# Patient Record
Sex: FEMALE | Race: WHITE | Employment: FULL TIME | URBAN - METROPOLITAN AREA
[De-identification: names, ages, dates, MRNs, and addresses within clinical notes are randomized per-mention and may not be internally consistent; named-entity substitution may affect disease eponyms.]

---

## 2021-01-31 ENCOUNTER — APPOINTMENT (OUTPATIENT)
Dept: CT IMAGING | Age: 56
DRG: 331 | End: 2021-01-31
Attending: PHYSICIAN ASSISTANT
Payer: COMMERCIAL

## 2021-01-31 ENCOUNTER — HOSPITAL ENCOUNTER (INPATIENT)
Age: 56
LOS: 3 days | Discharge: HOME | DRG: 331 | End: 2021-02-03
Attending: EMERGENCY MEDICINE | Admitting: SURGERY
Payer: COMMERCIAL

## 2021-01-31 ENCOUNTER — APPOINTMENT (OUTPATIENT)
Dept: GENERAL RADIOLOGY | Age: 56
DRG: 331 | End: 2021-01-31
Attending: PHYSICIAN ASSISTANT
Payer: COMMERCIAL

## 2021-01-31 DIAGNOSIS — Z90.49 S/P LAPAROSCOPIC APPENDECTOMY: ICD-10-CM

## 2021-01-31 DIAGNOSIS — K35.80 ACUTE APPENDICITIS, UNSPECIFIED ACUTE APPENDICITIS TYPE: Primary | ICD-10-CM

## 2021-01-31 LAB
ALBUMIN SERPL-MCNC: 3.4 G/DL (ref 3.4–5)
ALBUMIN/GLOB SERPL: 0.8 {RATIO} (ref 0.8–1.7)
ALP SERPL-CCNC: 95 U/L (ref 45–117)
ALT SERPL-CCNC: 26 U/L (ref 13–56)
ANION GAP SERPL CALC-SCNC: 6 MMOL/L (ref 3–18)
APPEARANCE UR: CLEAR
APTT PPP: 31.8 SEC (ref 23–36.4)
AST SERPL-CCNC: 16 U/L (ref 10–38)
BASOPHILS # BLD: 0 K/UL (ref 0–0.1)
BASOPHILS NFR BLD: 0 % (ref 0–2)
BILIRUB SERPL-MCNC: 0.5 MG/DL (ref 0.2–1)
BILIRUB UR QL: NEGATIVE
BUN SERPL-MCNC: 16 MG/DL (ref 7–18)
BUN/CREAT SERPL: 22 (ref 12–20)
CALCIUM SERPL-MCNC: 9.4 MG/DL (ref 8.5–10.1)
CHLORIDE SERPL-SCNC: 102 MMOL/L (ref 100–111)
CK MB CFR SERPL CALC: NORMAL % (ref 0–4)
CK MB SERPL-MCNC: <1 NG/ML (ref 5–25)
CK SERPL-CCNC: 45 U/L (ref 26–192)
CO2 SERPL-SCNC: 28 MMOL/L (ref 21–32)
COLOR UR: YELLOW
CREAT SERPL-MCNC: 0.74 MG/DL (ref 0.6–1.3)
DIFFERENTIAL METHOD BLD: ABNORMAL
EOSINOPHIL # BLD: 0.1 K/UL (ref 0–0.4)
EOSINOPHIL NFR BLD: 0 % (ref 0–5)
ERYTHROCYTE [DISTWIDTH] IN BLOOD BY AUTOMATED COUNT: 12.8 % (ref 11.6–14.5)
GLOBULIN SER CALC-MCNC: 4.1 G/DL (ref 2–4)
GLUCOSE SERPL-MCNC: 102 MG/DL (ref 74–99)
GLUCOSE UR STRIP.AUTO-MCNC: NEGATIVE MG/DL
HCG UR QL: NEGATIVE
HCT VFR BLD AUTO: 45.7 % (ref 35–45)
HGB BLD-MCNC: 15.8 G/DL (ref 12–16)
HGB UR QL STRIP: NEGATIVE
INR PPP: 1 (ref 0.8–1.2)
KETONES UR QL STRIP.AUTO: NEGATIVE MG/DL
LEUKOCYTE ESTERASE UR QL STRIP.AUTO: NEGATIVE
LIPASE SERPL-CCNC: 97 U/L (ref 73–393)
LYMPHOCYTES # BLD: 2.5 K/UL (ref 0.9–3.6)
LYMPHOCYTES NFR BLD: 14 % (ref 21–52)
MCH RBC QN AUTO: 31.2 PG (ref 24–34)
MCHC RBC AUTO-ENTMCNC: 34.6 G/DL (ref 31–37)
MCV RBC AUTO: 90.3 FL (ref 74–97)
MONOCYTES # BLD: 1.2 K/UL (ref 0.05–1.2)
MONOCYTES NFR BLD: 7 % (ref 3–10)
NEUTS SEG # BLD: 14.8 K/UL (ref 1.8–8)
NEUTS SEG NFR BLD: 79 % (ref 40–73)
NITRITE UR QL STRIP.AUTO: NEGATIVE
PH UR STRIP: 6 [PH] (ref 5–8)
PLATELET # BLD AUTO: 383 K/UL (ref 135–420)
PMV BLD AUTO: 9.6 FL (ref 9.2–11.8)
POTASSIUM SERPL-SCNC: 3 MMOL/L (ref 3.5–5.5)
PROT SERPL-MCNC: 7.5 G/DL (ref 6.4–8.2)
PROT UR STRIP-MCNC: NEGATIVE MG/DL
PROTHROMBIN TIME: 13.4 SEC (ref 11.5–15.2)
RBC # BLD AUTO: 5.06 M/UL (ref 4.2–5.3)
SODIUM SERPL-SCNC: 136 MMOL/L (ref 136–145)
SP GR UR REFRACTOMETRY: 1.01 (ref 1–1.03)
TROPONIN I SERPL-MCNC: <0.02 NG/ML (ref 0–0.04)
UROBILINOGEN UR QL STRIP.AUTO: 0.2 EU/DL (ref 0.2–1)
WBC # BLD AUTO: 18.6 K/UL (ref 4.6–13.2)

## 2021-01-31 PROCEDURE — 96375 TX/PRO/DX INJ NEW DRUG ADDON: CPT

## 2021-01-31 PROCEDURE — 96365 THER/PROPH/DIAG IV INF INIT: CPT

## 2021-01-31 PROCEDURE — 71045 X-RAY EXAM CHEST 1 VIEW: CPT

## 2021-01-31 PROCEDURE — 74011000258 HC RX REV CODE- 258: Performed by: PHYSICIAN ASSISTANT

## 2021-01-31 PROCEDURE — 83690 ASSAY OF LIPASE: CPT

## 2021-01-31 PROCEDURE — 74177 CT ABD & PELVIS W/CONTRAST: CPT

## 2021-01-31 PROCEDURE — 65270000029 HC RM PRIVATE

## 2021-01-31 PROCEDURE — 82553 CREATINE MB FRACTION: CPT

## 2021-01-31 PROCEDURE — 74011250636 HC RX REV CODE- 250/636: Performed by: PHYSICIAN ASSISTANT

## 2021-01-31 PROCEDURE — 74011250636 HC RX REV CODE- 250/636: Performed by: SURGERY

## 2021-01-31 PROCEDURE — 81025 URINE PREGNANCY TEST: CPT

## 2021-01-31 PROCEDURE — 99218 HC RM OBSERVATION: CPT

## 2021-01-31 PROCEDURE — 99285 EMERGENCY DEPT VISIT HI MDM: CPT

## 2021-01-31 PROCEDURE — 96367 TX/PROPH/DG ADDL SEQ IV INF: CPT

## 2021-01-31 PROCEDURE — 74011000636 HC RX REV CODE- 636: Performed by: EMERGENCY MEDICINE

## 2021-01-31 PROCEDURE — 80053 COMPREHEN METABOLIC PANEL: CPT

## 2021-01-31 PROCEDURE — 85025 COMPLETE CBC W/AUTO DIFF WBC: CPT

## 2021-01-31 PROCEDURE — 93005 ELECTROCARDIOGRAM TRACING: CPT

## 2021-01-31 PROCEDURE — 85610 PROTHROMBIN TIME: CPT

## 2021-01-31 PROCEDURE — 85730 THROMBOPLASTIN TIME PARTIAL: CPT

## 2021-01-31 PROCEDURE — 81003 URINALYSIS AUTO W/O SCOPE: CPT

## 2021-01-31 RX ORDER — SODIUM CHLORIDE, SODIUM LACTATE, POTASSIUM CHLORIDE, CALCIUM CHLORIDE 600; 310; 30; 20 MG/100ML; MG/100ML; MG/100ML; MG/100ML
50 INJECTION, SOLUTION INTRAVENOUS CONTINUOUS
Status: DISCONTINUED | OUTPATIENT
Start: 2021-01-31 | End: 2021-02-03

## 2021-01-31 RX ORDER — ONDANSETRON 2 MG/ML
8 INJECTION INTRAMUSCULAR; INTRAVENOUS
Status: DISCONTINUED | OUTPATIENT
Start: 2021-01-31 | End: 2021-02-03

## 2021-01-31 RX ORDER — MORPHINE SULFATE 2 MG/ML
1 INJECTION, SOLUTION INTRAMUSCULAR; INTRAVENOUS
Status: DISCONTINUED | OUTPATIENT
Start: 2021-01-31 | End: 2021-02-03

## 2021-01-31 RX ORDER — ONDANSETRON 2 MG/ML
4 INJECTION INTRAMUSCULAR; INTRAVENOUS
Status: COMPLETED | OUTPATIENT
Start: 2021-01-31 | End: 2021-01-31

## 2021-01-31 RX ORDER — MORPHINE SULFATE 4 MG/ML
4 INJECTION INTRAVENOUS
Status: COMPLETED | OUTPATIENT
Start: 2021-01-31 | End: 2021-01-31

## 2021-01-31 RX ORDER — POTASSIUM CHLORIDE 7.45 MG/ML
10 INJECTION INTRAVENOUS
Status: COMPLETED | OUTPATIENT
Start: 2021-01-31 | End: 2021-01-31

## 2021-01-31 RX ORDER — SODIUM CHLORIDE 9 MG/ML
100 INJECTION, SOLUTION INTRAVENOUS CONTINUOUS
Status: DISCONTINUED | OUTPATIENT
Start: 2021-01-31 | End: 2021-01-31 | Stop reason: CLARIF

## 2021-01-31 RX ADMIN — MORPHINE SULFATE 4 MG: 4 INJECTION INTRAVENOUS at 21:07

## 2021-01-31 RX ADMIN — SODIUM CHLORIDE, SODIUM LACTATE, POTASSIUM CHLORIDE, AND CALCIUM CHLORIDE 1000 ML: 600; 310; 30; 20 INJECTION, SOLUTION INTRAVENOUS at 18:45

## 2021-01-31 RX ADMIN — PIPERACILLIN AND TAZOBACTAM 4.5 G: 4; .5 INJECTION, POWDER, LYOPHILIZED, FOR SOLUTION INTRAVENOUS; PARENTERAL at 21:28

## 2021-01-31 RX ADMIN — IOPAMIDOL 100 ML: 612 INJECTION, SOLUTION INTRAVENOUS at 19:26

## 2021-01-31 RX ADMIN — SODIUM CHLORIDE, SODIUM LACTATE, POTASSIUM CHLORIDE, AND CALCIUM CHLORIDE 100 ML/HR: 600; 310; 30; 20 INJECTION, SOLUTION INTRAVENOUS at 21:02

## 2021-01-31 RX ADMIN — ONDANSETRON 4 MG: 2 INJECTION INTRAMUSCULAR; INTRAVENOUS at 21:07

## 2021-01-31 RX ADMIN — SODIUM CHLORIDE, SODIUM LACTATE, POTASSIUM CHLORIDE, AND CALCIUM CHLORIDE 1000 ML: 600; 310; 30; 20 INJECTION, SOLUTION INTRAVENOUS at 20:02

## 2021-01-31 RX ADMIN — POTASSIUM CHLORIDE 10 MEQ: 10 INJECTION, SOLUTION INTRAVENOUS at 20:05

## 2021-01-31 NOTE — ED TRIAGE NOTES
Pt reports generalized abdominal cramping that started Saturday then improved some but returned and is now only in the RLQ.

## 2021-01-31 NOTE — ED PROVIDER NOTES
EMERGENCY DEPARTMENT HISTORY AND PHYSICAL EXAM    Date: 1/31/2021  Patient Name: Suraj Lind    History of Presenting Illness     Chief Complaint   Patient presents with    Abdominal Pain     RLQ         History Provided By: Patient    Chief Complaint: abdominal pain    HPI(Context):   6:05 PM  Suraj Lind is a 54 y.o. female with PMHX of HTN who presents to the emergency department C/O RLQ abdominal pain. Associated sxs include anorexia. Pain is 5/10. No radiation. Worse with movement and with standing. Pain began at 0100 as generalized abdominal pain but now focal in RLQ. Pt denies fever, chills, vomiting, dysuria, vaginal bleeding, hx of stones, and any other sxs or complaints. Pt's last PO intake was soup at 1500 today. PCP: Brandt Gamboa DO        Past History     Past Medical History:  Past Medical History:   Diagnosis Date    Hypertension        Past Surgical History:  History reviewed. No pertinent surgical history. Family History:  History reviewed. No pertinent family history. Social History:  Social History     Tobacco Use    Smoking status: Never Smoker    Smokeless tobacco: Never Used   Substance Use Topics    Alcohol use: Yes     Comment: soc    Drug use: Never       Allergies:  No Known Allergies      Review of Systems   Review of Systems   Constitutional: Positive for appetite change. Negative for chills and fever. Gastrointestinal: Positive for abdominal pain. Negative for nausea and vomiting. Genitourinary: Negative for dysuria, hematuria and vaginal bleeding. Musculoskeletal: Negative for back pain. All other systems reviewed and are negative. Physical Exam     Vitals:    01/31/21 1827 01/31/21 1900 01/31/21 2006 01/31/21 2030   BP: (!) 163/68 (!) 153/63  (!) 173/59   Pulse:   99    Resp:       Temp:       SpO2: 96% 98% 98% 97%     Physical Exam  Vitals signs and nursing note reviewed.    Constitutional:       General: She is not in acute distress. Appearance: She is well-developed. She is not diaphoretic. Comments:  female in NAD. Alert. HENT:      Head: Normocephalic and atraumatic. Right Ear: External ear normal.      Left Ear: External ear normal.      Nose: Nose normal.   Eyes:      General: No scleral icterus. Right eye: No discharge. Left eye: No discharge. Conjunctiva/sclera: Conjunctivae normal.   Neck:      Musculoskeletal: Normal range of motion. Cardiovascular:      Rate and Rhythm: Normal rate and regular rhythm. Heart sounds: Normal heart sounds. No murmur. No friction rub. No gallop. Pulmonary:      Effort: Pulmonary effort is normal. No tachypnea, accessory muscle usage or respiratory distress. Breath sounds: Normal breath sounds. No decreased breath sounds, wheezing, rhonchi or rales. Abdominal:      Palpations: Abdomen is soft. Tenderness: There is abdominal tenderness in the right lower quadrant. There is no right CVA tenderness, left CVA tenderness or guarding. Positive signs include McBurney's sign. Musculoskeletal: Normal range of motion. Skin:     General: Skin is warm and dry. Neurological:      Mental Status: She is alert and oriented to person, place, and time.    Psychiatric:         Behavior: Behavior normal.         Judgment: Judgment normal.             Diagnostic Study Results     Labs -     Recent Results (from the past 12 hour(s))   URINALYSIS W/ RFLX MICROSCOPIC    Collection Time: 01/31/21  6:20 PM   Result Value Ref Range    Color YELLOW      Appearance CLEAR      Specific gravity 1.009 1.005 - 1.030      pH (UA) 6.0 5.0 - 8.0      Protein Negative NEG mg/dL    Glucose Negative NEG mg/dL    Ketone Negative NEG mg/dL    Bilirubin Negative NEG      Blood Negative NEG      Urobilinogen 0.2 0.2 - 1.0 EU/dL    Nitrites Negative NEG      Leukocyte Esterase Negative NEG     CBC WITH AUTOMATED DIFF    Collection Time: 01/31/21  6:20 PM   Result Value Ref Range    WBC 18.6 (H) 4.6 - 13.2 K/uL    RBC 5.06 4.20 - 5.30 M/uL    HGB 15.8 12.0 - 16.0 g/dL    HCT 45.7 (H) 35.0 - 45.0 %    MCV 90.3 74.0 - 97.0 FL    MCH 31.2 24.0 - 34.0 PG    MCHC 34.6 31.0 - 37.0 g/dL    RDW 12.8 11.6 - 14.5 %    PLATELET 731 880 - 627 K/uL    MPV 9.6 9.2 - 11.8 FL    NEUTROPHILS 79 (H) 40 - 73 %    LYMPHOCYTES 14 (L) 21 - 52 %    MONOCYTES 7 3 - 10 %    EOSINOPHILS 0 0 - 5 %    BASOPHILS 0 0 - 2 %    ABS. NEUTROPHILS 14.8 (H) 1.8 - 8.0 K/UL    ABS. LYMPHOCYTES 2.5 0.9 - 3.6 K/UL    ABS. MONOCYTES 1.2 0.05 - 1.2 K/UL    ABS. EOSINOPHILS 0.1 0.0 - 0.4 K/UL    ABS. BASOPHILS 0.0 0.0 - 0.1 K/UL    DF AUTOMATED     METABOLIC PANEL, COMPREHENSIVE    Collection Time: 01/31/21  6:20 PM   Result Value Ref Range    Sodium 136 136 - 145 mmol/L    Potassium 3.0 (L) 3.5 - 5.5 mmol/L    Chloride 102 100 - 111 mmol/L    CO2 28 21 - 32 mmol/L    Anion gap 6 3.0 - 18 mmol/L    Glucose 102 (H) 74 - 99 mg/dL    BUN 16 7.0 - 18 MG/DL    Creatinine 0.74 0.6 - 1.3 MG/DL    BUN/Creatinine ratio 22 (H) 12 - 20      GFR est AA >60 >60 ml/min/1.73m2    GFR est non-AA >60 >60 ml/min/1.73m2    Calcium 9.4 8.5 - 10.1 MG/DL    Bilirubin, total 0.5 0.2 - 1.0 MG/DL    ALT (SGPT) 26 13 - 56 U/L    AST (SGOT) 16 10 - 38 U/L    Alk.  phosphatase 95 45 - 117 U/L    Protein, total 7.5 6.4 - 8.2 g/dL    Albumin 3.4 3.4 - 5.0 g/dL    Globulin 4.1 (H) 2.0 - 4.0 g/dL    A-G Ratio 0.8 0.8 - 1.7     LIPASE    Collection Time: 01/31/21  6:20 PM   Result Value Ref Range    Lipase 97 73 - 393 U/L   PROTHROMBIN TIME + INR    Collection Time: 01/31/21  6:20 PM   Result Value Ref Range    Prothrombin time 13.4 11.5 - 15.2 sec    INR 1.0 0.8 - 1.2     PTT    Collection Time: 01/31/21  6:20 PM   Result Value Ref Range    aPTT 31.8 23.0 - 36.4 SEC   HCG URINE, QL. - POC    Collection Time: 01/31/21  6:30 PM   Result Value Ref Range    Pregnancy test,urine (POC) Negative NEG     EKG, 12 LEAD, INITIAL    Collection Time: 01/31/21  8:12 PM Result Value Ref Range    Ventricular Rate 103 BPM    Atrial Rate 103 BPM    P-R Interval 168 ms    QRS Duration 88 ms    Q-T Interval 346 ms    QTC Calculation (Bezet) 453 ms    Calculated P Axis 7 degrees    Calculated R Axis 6 degrees    Calculated T Axis 31 degrees    Diagnosis       Sinus tachycardia  Septal infarct , age undetermined  Abnormal ECG  No previous ECGs available             CT ABD PELV W CONT   Final Result      Inflammatory changes are present associated with a mildly thickened appendix. Appearance is consistent with acute appendicitis      Colonic diverticulosis            XR CHEST PORT    (Results Pending)     CT Results  (Last 48 hours)               01/31/21 1937  CT ABD PELV W CONT Final result    Impression:      Inflammatory changes are present associated with a mildly thickened appendix. Appearance is consistent with acute appendicitis       Colonic diverticulosis               Narrative:  EXAM: CT of the abdomen and pelvis       INDICATION: RLQ abdominal pain   -Additional: None       COMPARISON: None. TECHNIQUE: Axial CT imaging of the abdomen and pelvis was performed after the   uneventful administration of 100 cc of Isovue-300 intravenous contrast.   Multiplanar reformats were generated. One or more dose reduction techniques were   used on this CT: automated exposure control, adjustment of the mAs and/or kVp   according to patient size, and iterative reconstruction techniques. The   specific techniques used on this CT exam have been documented in the patient's   electronic medical record. Digital [employer] in Medicine   (DICOM) format image data are available to nonaffiliated external healthcare   facilities or entities on a secure, media free, reciprocally searchable basis   with patient authorization for at least a 12-month period after this study.         _______________       FINDINGS:       Lower Chest: Scattered atelectasis       Liver, Biliary: Liver is normal. No biliary dilation. Gallbladder is   unremarkable. Pancreas: Normal.       Spleen: Normal.       Adrenals: Normal.       Kidneys: Normal.       Lymph Nodes: No enlarged lymph nodes. Gastrointestinal Tract: Inflammatory changes are present adjacent to the cecum   and appendix. The appendix is mildly thickened measuring 13 mm in diameter. The   findings are compatible with acute appendicitis. No abscess or perforation is   evident. Scattered colonic diverticula are evident       Pelvic Organs: Unremarkable. Vasculature: Unremarkable. Bones: No acute or aggressive osseous abnormalities identified. Other: None.       _______________               CXR Results  (Last 48 hours)    None          Medications given in the ED-  Medications   cefOXitin (MEFOXIN) 2 g in 0.9% sodium chloride (MBP/ADV) 50 mL MBP (has no administration in time range)   lactated Ringers infusion (100 mL/hr IntraVENous New Bag 1/31/21 2102)   morphine injection 1 mg (has no administration in time range)   ondansetron (ZOFRAN) injection 8 mg (has no administration in time range)   piperacillin-tazobactam (ZOSYN) 4.5 g in 0.9% sodium chloride (MBP/ADV) 100 mL MBP (has no administration in time range)   lactated ringers bolus infusion 1,000 mL (0 mL IntraVENous IV Completed 1/31/21 1926)   iopamidoL (ISOVUE 300) 61 % contrast injection 100 mL (100 mL IntraVENous Given 1/31/21 1926)   potassium chloride 10 mEq in 100 ml IVPB (10 mEq IntraVENous New Bag 1/31/21 2005)   lactated ringers bolus infusion 1,000 mL (0 mL IntraVENous IV Completed 1/31/21 2100)   morphine injection 4 mg (4 mg IntraVENous Given 1/31/21 2107)   ondansetron (ZOFRAN) injection 4 mg (4 mg IntraVENous Given 1/31/21 2107)         Medical Decision Making   I am the first provider for this patient.     I reviewed the vital signs, available nursing notes, past medical history, past surgical history, family history and social history. Vital Signs-Reviewed the patient's vital signs. Pulse Oximetry Analysis - 100% on RA. NORMAL      Records Reviewed: Nursing Notes    Provider Notes (Medical Decision Making): appy, colitis, diverticulitis, gastroenteritis, pancreatitis, hepatitis, UTI/pyelo, stone, ovarian cyst    Procedures:  Procedures    ED Course:   6:05 PM Initial assessment performed. The patients presenting problems have been discussed, and they are in agreement with the care plan formulated and outlined with them. I have encouraged them to ask questions as they arise throughout their visit. Diagnosis and Disposition       9:13 PM Consult: I discussed care with Dr. Ana Burkett (General Surgery) It was a standard discussion, including history of patients chief complaint, available diagnostic results, and treatment course. Agrees with Zosyn. Will add cardiac enzymes and CXR. EKG unremarkable (reviewed with attending). Plan is for lap appy at 0530 tomorrow. 1. Acute appendicitis, unspecified acute appendicitis type        PLAN:  1. Admit to General Surgery  _______________________________  Attestations: This note is prepared by Raji Villalpando PA-C.  _______________________________          Please note that this dictation was completed with [employer], the computer voice recognition software. Quite often unanticipated grammatical, syntax, homophones, and other interpretive errors are inadvertently transcribed by the computer software. Please disregard these errors. Please excuse any errors that have escaped final proofreading.

## 2021-02-01 ENCOUNTER — ANESTHESIA EVENT (OUTPATIENT)
Dept: SURGERY | Age: 56
DRG: 331 | End: 2021-02-01
Payer: COMMERCIAL

## 2021-02-01 ENCOUNTER — ANESTHESIA (OUTPATIENT)
Dept: SURGERY | Age: 56
DRG: 331 | End: 2021-02-01
Payer: COMMERCIAL

## 2021-02-01 PROBLEM — K35.32 ACUTE APPENDICITIS WITH PERFORATION AND LOCALIZED PERITONITIS: Status: ACTIVE | Noted: 2021-02-01

## 2021-02-01 PROBLEM — Z90.49 S/P LAPAROSCOPIC APPENDECTOMY: Status: ACTIVE | Noted: 2021-02-01

## 2021-02-01 LAB
ATRIAL RATE: 103 BPM
CALCULATED P AXIS, ECG09: 7 DEGREES
CALCULATED R AXIS, ECG10: 6 DEGREES
CALCULATED T AXIS, ECG11: 31 DEGREES
DIAGNOSIS, 93000: NORMAL
P-R INTERVAL, ECG05: 168 MS
Q-T INTERVAL, ECG07: 346 MS
QRS DURATION, ECG06: 88 MS
QTC CALCULATION (BEZET), ECG08: 453 MS
VENTRICULAR RATE, ECG03: 103 BPM

## 2021-02-01 PROCEDURE — 77030008477 HC STYL SATN SLP COVD -A: Performed by: ANESTHESIOLOGY

## 2021-02-01 PROCEDURE — 77030034154 HC SHR COAG HARM ACE J&J -F: Performed by: SURGERY

## 2021-02-01 PROCEDURE — 74011000250 HC RX REV CODE- 250: Performed by: ANESTHESIOLOGY

## 2021-02-01 PROCEDURE — 74011250636 HC RX REV CODE- 250/636: Performed by: REGISTERED NURSE

## 2021-02-01 PROCEDURE — 77030003578 HC NDL INSUF VERES AMR -B: Performed by: SURGERY

## 2021-02-01 PROCEDURE — 2709999900 HC NON-CHARGEABLE SUPPLY: Performed by: SURGERY

## 2021-02-01 PROCEDURE — 76010000149 HC OR TIME 1 TO 1.5 HR: Performed by: SURGERY

## 2021-02-01 PROCEDURE — 77030010030: Performed by: SURGERY

## 2021-02-01 PROCEDURE — 77030008574 HC TBNG SUC IRR STRY -B: Performed by: SURGERY

## 2021-02-01 PROCEDURE — 77030008608 HC TRCR ENDOSC SMTH AMR -B: Performed by: SURGERY

## 2021-02-01 PROCEDURE — 77030002916 HC SUT ETHLN J&J -A: Performed by: SURGERY

## 2021-02-01 PROCEDURE — 77030031139 HC SUT VCRL2 J&J -A: Performed by: SURGERY

## 2021-02-01 PROCEDURE — 77030010507 HC ADH SKN DERMBND J&J -B: Performed by: SURGERY

## 2021-02-01 PROCEDURE — 77030013567 HC DRN WND RESERV BARD -A: Performed by: SURGERY

## 2021-02-01 PROCEDURE — 76060000033 HC ANESTHESIA 1 TO 1.5 HR: Performed by: SURGERY

## 2021-02-01 PROCEDURE — 74011000250 HC RX REV CODE- 250: Performed by: SURGERY

## 2021-02-01 PROCEDURE — 77030008518 HC TBNG INSUF ENDO STRY -B: Performed by: SURGERY

## 2021-02-01 PROCEDURE — 77030018875 HC APPL CLP LIG4 J&J -B: Performed by: SURGERY

## 2021-02-01 PROCEDURE — 74011250636 HC RX REV CODE- 250/636: Performed by: ANESTHESIOLOGY

## 2021-02-01 PROCEDURE — 74011250637 HC RX REV CODE- 250/637: Performed by: SURGERY

## 2021-02-01 PROCEDURE — 77030009967 HC RELD STPLR ENDOSC J&J -C: Performed by: SURGERY

## 2021-02-01 PROCEDURE — 77030011296 HC FCPS GRSP ENDOSC J&J -B: Performed by: SURGERY

## 2021-02-01 PROCEDURE — 0DTJ4ZZ RESECTION OF APPENDIX, PERCUTANEOUS ENDOSCOPIC APPROACH: ICD-10-PCS | Performed by: SURGERY

## 2021-02-01 PROCEDURE — 99218 HC RM OBSERVATION: CPT

## 2021-02-01 PROCEDURE — 77030009851 HC PCH RTVR ENDOSC AMR -B: Performed by: SURGERY

## 2021-02-01 PROCEDURE — 74011000258 HC RX REV CODE- 258: Performed by: SURGERY

## 2021-02-01 PROCEDURE — 77030020829: Performed by: SURGERY

## 2021-02-01 PROCEDURE — 77030006643: Performed by: ANESTHESIOLOGY

## 2021-02-01 PROCEDURE — 77030040504 HC DRN WND MDII -B: Performed by: SURGERY

## 2021-02-01 PROCEDURE — 77030008683 HC TU ET CUF COVD -A: Performed by: ANESTHESIOLOGY

## 2021-02-01 PROCEDURE — 77010033678 HC OXYGEN DAILY

## 2021-02-01 PROCEDURE — 77030013079 HC BLNKT BAIR HGGR 3M -A: Performed by: ANESTHESIOLOGY

## 2021-02-01 PROCEDURE — 77030016151 HC PROTCTR LNS DFOG COVD -B: Performed by: SURGERY

## 2021-02-01 PROCEDURE — 76210000006 HC OR PH I REC 0.5 TO 1 HR: Performed by: SURGERY

## 2021-02-01 PROCEDURE — 77030009403 HC ELECTRD ENDO MEGA -B: Performed by: SURGERY

## 2021-02-01 PROCEDURE — 0DBH4ZZ EXCISION OF CECUM, PERCUTANEOUS ENDOSCOPIC APPROACH: ICD-10-PCS | Performed by: SURGERY

## 2021-02-01 PROCEDURE — 77030011810 HC STPLR ENDOSC J&J -G: Performed by: SURGERY

## 2021-02-01 PROCEDURE — 88304 TISSUE EXAM BY PATHOLOGIST: CPT

## 2021-02-01 PROCEDURE — 65270000029 HC RM PRIVATE

## 2021-02-01 PROCEDURE — 74011250636 HC RX REV CODE- 250/636: Performed by: SURGERY

## 2021-02-01 PROCEDURE — 77030002933 HC SUT MCRYL J&J -A: Performed by: SURGERY

## 2021-02-01 PROCEDURE — 77030012770 HC TRCR OPT FX AMR -B: Performed by: SURGERY

## 2021-02-01 RX ORDER — ESMOLOL HYDROCHLORIDE 10 MG/ML
INJECTION INTRAVENOUS AS NEEDED
Status: DISCONTINUED | OUTPATIENT
Start: 2021-02-01 | End: 2021-02-01 | Stop reason: HOSPADM

## 2021-02-01 RX ORDER — ACETAMINOPHEN 325 MG/1
650 TABLET ORAL
Status: DISCONTINUED | OUTPATIENT
Start: 2021-02-01 | End: 2021-02-03 | Stop reason: HOSPADM

## 2021-02-01 RX ORDER — KETOROLAC TROMETHAMINE 15 MG/ML
INJECTION, SOLUTION INTRAMUSCULAR; INTRAVENOUS AS NEEDED
Status: DISCONTINUED | OUTPATIENT
Start: 2021-02-01 | End: 2021-02-01 | Stop reason: HOSPADM

## 2021-02-01 RX ORDER — FENTANYL CITRATE 50 UG/ML
INJECTION, SOLUTION INTRAMUSCULAR; INTRAVENOUS AS NEEDED
Status: DISCONTINUED | OUTPATIENT
Start: 2021-02-01 | End: 2021-02-01 | Stop reason: HOSPADM

## 2021-02-01 RX ORDER — DEXTROSE MONOHYDRATE 100 MG/ML
125-250 INJECTION, SOLUTION INTRAVENOUS AS NEEDED
Status: DISCONTINUED | OUTPATIENT
Start: 2021-02-01 | End: 2021-02-01 | Stop reason: HOSPADM

## 2021-02-01 RX ORDER — ROCURONIUM BROMIDE 10 MG/ML
INJECTION, SOLUTION INTRAVENOUS AS NEEDED
Status: DISCONTINUED | OUTPATIENT
Start: 2021-02-01 | End: 2021-02-01 | Stop reason: HOSPADM

## 2021-02-01 RX ORDER — HYDROCHLOROTHIAZIDE 25 MG/1
25 TABLET ORAL DAILY
COMMUNITY

## 2021-02-01 RX ORDER — SODIUM CHLORIDE 0.9 % (FLUSH) 0.9 %
5-40 SYRINGE (ML) INJECTION AS NEEDED
Status: DISCONTINUED | OUTPATIENT
Start: 2021-02-01 | End: 2021-02-01 | Stop reason: HOSPADM

## 2021-02-01 RX ORDER — HYDROCHLOROTHIAZIDE 25 MG/1
25 TABLET ORAL DAILY
Status: DISCONTINUED | OUTPATIENT
Start: 2021-02-01 | End: 2021-02-03 | Stop reason: HOSPADM

## 2021-02-01 RX ORDER — POTASSIUM CHLORIDE 7.45 MG/ML
10 INJECTION INTRAVENOUS
Status: DISCONTINUED | OUTPATIENT
Start: 2021-02-01 | End: 2021-02-01

## 2021-02-01 RX ORDER — LIDOCAINE HYDROCHLORIDE 20 MG/ML
INJECTION, SOLUTION EPIDURAL; INFILTRATION; INTRACAUDAL; PERINEURAL AS NEEDED
Status: DISCONTINUED | OUTPATIENT
Start: 2021-02-01 | End: 2021-02-01 | Stop reason: HOSPADM

## 2021-02-01 RX ORDER — NEOSTIGMINE METHYLSULFATE 1 MG/ML
INJECTION, SOLUTION INTRAVENOUS AS NEEDED
Status: DISCONTINUED | OUTPATIENT
Start: 2021-02-01 | End: 2021-02-01 | Stop reason: HOSPADM

## 2021-02-01 RX ORDER — MIDAZOLAM HYDROCHLORIDE 1 MG/ML
INJECTION, SOLUTION INTRAMUSCULAR; INTRAVENOUS AS NEEDED
Status: DISCONTINUED | OUTPATIENT
Start: 2021-02-01 | End: 2021-02-01 | Stop reason: HOSPADM

## 2021-02-01 RX ORDER — FLUMAZENIL 0.1 MG/ML
0.2 INJECTION INTRAVENOUS
Status: DISCONTINUED | OUTPATIENT
Start: 2021-02-01 | End: 2021-02-01 | Stop reason: HOSPADM

## 2021-02-01 RX ORDER — DEXAMETHASONE SODIUM PHOSPHATE 4 MG/ML
INJECTION, SOLUTION INTRA-ARTICULAR; INTRALESIONAL; INTRAMUSCULAR; INTRAVENOUS; SOFT TISSUE AS NEEDED
Status: DISCONTINUED | OUTPATIENT
Start: 2021-02-01 | End: 2021-02-01 | Stop reason: HOSPADM

## 2021-02-01 RX ORDER — KETAMINE HCL IN 0.9 % NACL 50 MG/5 ML
SYRINGE (ML) INTRAVENOUS AS NEEDED
Status: DISCONTINUED | OUTPATIENT
Start: 2021-02-01 | End: 2021-02-01 | Stop reason: HOSPADM

## 2021-02-01 RX ORDER — PROPOFOL 10 MG/ML
INJECTION, EMULSION INTRAVENOUS AS NEEDED
Status: DISCONTINUED | OUTPATIENT
Start: 2021-02-01 | End: 2021-02-01 | Stop reason: HOSPADM

## 2021-02-01 RX ORDER — ZOLPIDEM TARTRATE 5 MG/1
5 TABLET ORAL
Status: DISCONTINUED | OUTPATIENT
Start: 2021-02-01 | End: 2021-02-03 | Stop reason: HOSPADM

## 2021-02-01 RX ORDER — ESZOPICLONE 3 MG/1
3 TABLET, FILM COATED ORAL
COMMUNITY

## 2021-02-01 RX ORDER — POTASSIUM CHLORIDE 7.45 MG/ML
10 INJECTION INTRAVENOUS
Status: DISCONTINUED | OUTPATIENT
Start: 2021-02-01 | End: 2021-02-01 | Stop reason: SDUPTHER

## 2021-02-01 RX ORDER — HYDROMORPHONE HYDROCHLORIDE 1 MG/ML
0.5 INJECTION, SOLUTION INTRAMUSCULAR; INTRAVENOUS; SUBCUTANEOUS
Status: DISCONTINUED | OUTPATIENT
Start: 2021-02-01 | End: 2021-02-01 | Stop reason: HOSPADM

## 2021-02-01 RX ORDER — NALOXONE HYDROCHLORIDE 0.4 MG/ML
0.1 INJECTION, SOLUTION INTRAMUSCULAR; INTRAVENOUS; SUBCUTANEOUS AS NEEDED
Status: DISCONTINUED | OUTPATIENT
Start: 2021-02-01 | End: 2021-02-01 | Stop reason: HOSPADM

## 2021-02-01 RX ORDER — HYDROMORPHONE HYDROCHLORIDE 2 MG/ML
INJECTION, SOLUTION INTRAMUSCULAR; INTRAVENOUS; SUBCUTANEOUS AS NEEDED
Status: DISCONTINUED | OUTPATIENT
Start: 2021-02-01 | End: 2021-02-01 | Stop reason: HOSPADM

## 2021-02-01 RX ORDER — MAGNESIUM SULFATE 100 %
4 CRYSTALS MISCELLANEOUS AS NEEDED
Status: DISCONTINUED | OUTPATIENT
Start: 2021-02-01 | End: 2021-02-01 | Stop reason: HOSPADM

## 2021-02-01 RX ORDER — BUPIVACAINE HYDROCHLORIDE AND EPINEPHRINE 5; 5 MG/ML; UG/ML
INJECTION, SOLUTION EPIDURAL; INTRACAUDAL; PERINEURAL AS NEEDED
Status: DISCONTINUED | OUTPATIENT
Start: 2021-02-01 | End: 2021-02-01 | Stop reason: HOSPADM

## 2021-02-01 RX ORDER — ONDANSETRON 2 MG/ML
INJECTION INTRAMUSCULAR; INTRAVENOUS AS NEEDED
Status: DISCONTINUED | OUTPATIENT
Start: 2021-02-01 | End: 2021-02-01 | Stop reason: HOSPADM

## 2021-02-01 RX ORDER — SUCCINYLCHOLINE CHLORIDE 100 MG/5ML
SYRINGE (ML) INTRAVENOUS AS NEEDED
Status: DISCONTINUED | OUTPATIENT
Start: 2021-02-01 | End: 2021-02-01 | Stop reason: HOSPADM

## 2021-02-01 RX ORDER — GLYCOPYRROLATE 0.2 MG/ML
INJECTION INTRAMUSCULAR; INTRAVENOUS AS NEEDED
Status: DISCONTINUED | OUTPATIENT
Start: 2021-02-01 | End: 2021-02-01 | Stop reason: HOSPADM

## 2021-02-01 RX ORDER — FENTANYL CITRATE 50 UG/ML
50 INJECTION, SOLUTION INTRAMUSCULAR; INTRAVENOUS AS NEEDED
Status: DISCONTINUED | OUTPATIENT
Start: 2021-02-01 | End: 2021-02-01 | Stop reason: HOSPADM

## 2021-02-01 RX ORDER — OXYCODONE AND ACETAMINOPHEN 5; 325 MG/1; MG/1
1 TABLET ORAL
Status: DISCONTINUED | OUTPATIENT
Start: 2021-02-01 | End: 2021-02-03 | Stop reason: HOSPADM

## 2021-02-01 RX ORDER — SODIUM CHLORIDE 0.9 % (FLUSH) 0.9 %
5-40 SYRINGE (ML) INJECTION EVERY 8 HOURS
Status: DISCONTINUED | OUTPATIENT
Start: 2021-02-01 | End: 2021-02-01 | Stop reason: HOSPADM

## 2021-02-01 RX ORDER — POTASSIUM CHLORIDE 7.45 MG/ML
10 INJECTION INTRAVENOUS
Status: COMPLETED | OUTPATIENT
Start: 2021-02-01 | End: 2021-02-01

## 2021-02-01 RX ADMIN — ACETAMINOPHEN 650 MG: 325 TABLET ORAL at 17:21

## 2021-02-01 RX ADMIN — POTASSIUM CHLORIDE 10 MEQ: 10 INJECTION, SOLUTION INTRAVENOUS at 15:39

## 2021-02-01 RX ADMIN — ESMOLOL HYDROCHLORIDE 30 MG: 10 INJECTION, SOLUTION INTRAVENOUS at 05:59

## 2021-02-01 RX ADMIN — PIPERACILLIN AND TAZOBACTAM 3.38 G: 3; .375 INJECTION, POWDER, LYOPHILIZED, FOR SOLUTION INTRAVENOUS at 22:21

## 2021-02-01 RX ADMIN — HYDROMORPHONE HYDROCHLORIDE 0.5 MG: 2 INJECTION, SOLUTION INTRAMUSCULAR; INTRAVENOUS; SUBCUTANEOUS at 06:25

## 2021-02-01 RX ADMIN — PROPOFOL 200 MG: 10 INJECTION, EMULSION INTRAVENOUS at 05:59

## 2021-02-01 RX ADMIN — ROCURONIUM BROMIDE 30 MG: 10 INJECTION, SOLUTION INTRAVENOUS at 06:14

## 2021-02-01 RX ADMIN — POTASSIUM CHLORIDE 10 MEQ: 10 INJECTION, SOLUTION INTRAVENOUS at 20:21

## 2021-02-01 RX ADMIN — PIPERACILLIN AND TAZOBACTAM 3.38 G: 3; .375 INJECTION, POWDER, LYOPHILIZED, FOR SOLUTION INTRAVENOUS at 14:07

## 2021-02-01 RX ADMIN — MIDAZOLAM 2 MG: 1 INJECTION INTRAMUSCULAR; INTRAVENOUS at 05:54

## 2021-02-01 RX ADMIN — ACETAMINOPHEN 650 MG: 325 TABLET ORAL at 22:20

## 2021-02-01 RX ADMIN — KETOROLAC TROMETHAMINE 30 MG: 15 INJECTION, SOLUTION INTRAMUSCULAR; INTRAVENOUS at 07:02

## 2021-02-01 RX ADMIN — DEXAMETHASONE SODIUM PHOSPHATE 4 MG: 4 INJECTION, SOLUTION INTRAMUSCULAR; INTRAVENOUS at 06:51

## 2021-02-01 RX ADMIN — DEXAMETHASONE SODIUM PHOSPHATE 4 MG: 4 INJECTION, SOLUTION INTRAMUSCULAR; INTRAVENOUS at 06:13

## 2021-02-01 RX ADMIN — ONDANSETRON HYDROCHLORIDE 4 MG: 2 INJECTION INTRAMUSCULAR; INTRAVENOUS at 06:51

## 2021-02-01 RX ADMIN — SODIUM CHLORIDE, SODIUM LACTATE, POTASSIUM CHLORIDE, AND CALCIUM CHLORIDE 100 ML/HR: 600; 310; 30; 20 INJECTION, SOLUTION INTRAVENOUS at 07:32

## 2021-02-01 RX ADMIN — HYDROMORPHONE HYDROCHLORIDE 0.5 MG: 2 INJECTION, SOLUTION INTRAMUSCULAR; INTRAVENOUS; SUBCUTANEOUS at 05:40

## 2021-02-01 RX ADMIN — POTASSIUM CHLORIDE 10 MEQ: 10 INJECTION, SOLUTION INTRAVENOUS at 17:26

## 2021-02-01 RX ADMIN — GLYCOPYRROLATE 0.6 MG: 0.2 INJECTION INTRAMUSCULAR; INTRAVENOUS at 07:06

## 2021-02-01 RX ADMIN — SODIUM CHLORIDE, SODIUM LACTATE, POTASSIUM CHLORIDE, AND CALCIUM CHLORIDE 100 ML/HR: 600; 310; 30; 20 INJECTION, SOLUTION INTRAVENOUS at 10:24

## 2021-02-01 RX ADMIN — SODIUM CHLORIDE, SODIUM LACTATE, POTASSIUM CHLORIDE, AND CALCIUM CHLORIDE: 600; 310; 30; 20 INJECTION, SOLUTION INTRAVENOUS at 06:10

## 2021-02-01 RX ADMIN — ONDANSETRON HYDROCHLORIDE 4 MG: 2 INJECTION INTRAMUSCULAR; INTRAVENOUS at 06:15

## 2021-02-01 RX ADMIN — ROCURONIUM BROMIDE 20 MG: 10 INJECTION, SOLUTION INTRAVENOUS at 05:59

## 2021-02-01 RX ADMIN — Medication 3 MG: at 07:03

## 2021-02-01 RX ADMIN — CEFOXITIN SODIUM 2 G: 2 POWDER, FOR SOLUTION INTRAVENOUS at 05:57

## 2021-02-01 RX ADMIN — MORPHINE SULFATE 1 MG: 2 INJECTION, SOLUTION INTRAMUSCULAR; INTRAVENOUS at 01:56

## 2021-02-01 RX ADMIN — Medication 30 MG: at 05:59

## 2021-02-01 RX ADMIN — ZOLPIDEM TARTRATE 5 MG: 5 TABLET ORAL at 22:26

## 2021-02-01 RX ADMIN — FENTANYL CITRATE 50 MCG: 50 INJECTION, SOLUTION INTRAMUSCULAR; INTRAVENOUS at 06:14

## 2021-02-01 RX ADMIN — LIDOCAINE HYDROCHLORIDE 100 MG: 20 INJECTION, SOLUTION EPIDURAL; INFILTRATION; INTRACAUDAL; PERINEURAL at 05:59

## 2021-02-01 RX ADMIN — Medication 100 MG: at 05:59

## 2021-02-01 RX ADMIN — Medication 20 MG: at 06:37

## 2021-02-01 RX ADMIN — FENTANYL CITRATE 50 MCG: 50 INJECTION, SOLUTION INTRAMUSCULAR; INTRAVENOUS at 06:48

## 2021-02-01 RX ADMIN — POTASSIUM CHLORIDE 10 MEQ: 10 INJECTION, SOLUTION INTRAVENOUS at 18:28

## 2021-02-01 NOTE — OP NOTES
[facility]  OPERAT[facility]    Name:  Sheilda Aschoff  MR#:   [id number]  :  1965  ACCOUNT #:  [de-identified]  DATE OF SERVICE:  2021    GENERAL SURGERY OPERATIVE NOTE    PREOPERATIVE DIAGNOSIS:  Acute appendicitis. POSTOPERATIVE DIAGNOSIS:  Acute appendicitis with perforation and small contained abscess. PROCEDURES PERFORMED:  1. Laparoscopic appendectomy. 1.  Laparoscopic partial cecectomy. SURGEON:  Mp Lopez MD    ASSISTANT:  None. ANESTHESIA:  General.    COMPLICATIONS:  None. SPECIMENS REMOVED:  Appendix, attached mesoappendix, and adjacent portion of cecum for permanent pathology. DRAINS:  A 19-Stateless channeled Seamus-Pulliam drain. IMPLANTS:  (SEE DRAIN). ESTIMATED BLOOD LOSS:  Minimal.    PROCEDURE:  The patient is a 66-year-old white female, admitted to the [facility] Emergency Room with a clinical and radiologic diagnosis of acute appendicitis. Symptoms have been going on at the time of my evaluation for approximately 48 hours. White count was elevated at 18. She was taken to the operating room, met and identified in the preoperative holding area by the operating room team.  A dose of Zosyn was given in the emergency room. An additional dose of Mefoxin 2 g given per preop protocol. She was then brought into the operating room, positioned on the table with all pressure points appropriately padded. Sequential compression devices were applied. It should be mentioned that the patient voided immediately before being brought back into the operating room in the preoperative holding area. General anesthesia was then administered with monitors and oxygen in place. The area was shaved with electric clippers, prepped and draped in the usual sterile fashion. After surgical pause, surgical sites were locally anesthetized with 0.25% Marcaine with epinephrine.   We started with a 5-mm transverse supraumbilical incision with 81-YFUMR through skin and dermis. The underlying subcutaneous tissue was bluntly dissected down to the midline fascia. Then, while elevating the adjacent umbilicus using penetrating towel clamp, Veress needle was inserted through the midline fascia, confirmed to be in the correct intra-abdominal position using saline aspiration followed by injection. Once this position was confirmed, Veress needle was used to establish pneumoperitoneum to a pressure of 15 mmHg. Once this pressure achieved, the Veress needle was removed and a 5-mm bladeless trocar was inserted while elevating the adjacent umbilicus using penetrating towel clamp. A 30-degree laparoscope was inserted. There was no evidence of any Veress needle or trocar-induced trauma to the underlying abdominal viscera. This area was again visualized at the end of procedure from the left lower quadrant trocar. There were no adhesions in this area and again no evidence of trauma to the underlying abdominal viscera was present. The patient was placed in Trendelenburg position, airplane right-side up. Next, a 5-mm transverse suprapubic incision was made, and through this, a 5-mm bladeless trocar was inserted under laparoscopic vision. Next, a 12-mm transverse left lower quadrant incision was made, and through this, a 12-mm bladeless trocar was inserted under laparoscopic vision. We directed our attention towards the right lower quadrant. The omentum was adhesed up to the right lower quadrant and lateral sidewall. This was dissected down with blunt dissection, encountered some brown murky fluid. At this point, this was aspirated. The appendix was then found significantly inflamed and I dissected it away from the lateral sidewall, I did see evidence of a small perforation contained abscess. This was suctioned out as completely as possible. The perforation was on the antimesenteric border, approximately 2 cm from the base of the appendix.   We continued to dissect the appendix away from the lateral sidewall with a combination of gentle blunt and Harmonic Focus scalpel dissection, came across the mesoappendix with Harmonic scalpel. Hemostasis was satisfactory throughout this portion of the procedure. Because of the significant inflammation and the proximity of the perforation and inflammation to the base of the appendix, I did feel that coming across portion of cecum was necessary to achieve viable healthy margins. This was done with two firings of the SKINNYprice-MARKO tissue load stapler taking a rim/ellipse of cecum incontinuity with the adjacent appendix and mesoappendix. It was placed into an EndoCatch bag, removed it through the left lower quadrant incision, sent for permanent pathology. The right lower quadrant was then thoroughly irrigated. Irrigation was evacuated as completely as possible. Two liters of irrigation  were used. On final inspection, the mesoappendix had good hemostasis. The staple line across the cecum was intact with no evidence of leakage. It should be mentioned that to allow adequate exposure I did have to insert an additional epigastric 5-mm transverse incision. This was done by inserting a 5-mm bladeless trocar under laparoscopic vision. At this point, we removed the left lower quadrant trocar. The fascial defect here was closed with a figure-of-eight 0 Vicryl suture, placed under laparoscopic vision using sharp suture passer. Because the amount of inflammation and contained perforation, I did feel that drain placement was necessary. I cut a 19-Armenian channeled Seamus-Pulliam drain to the appropriate length, brought it from the epigastric trocar and brought the drain out through the suprapubic incision with the trocar, placed it in the right lower quadrant along the paracolic gutter, secured at the skin with a 3-0 nylon suture. Cut the external length appropriately and passed it to the bulb. Pneumoperitoneum was evacuated.   Trocars removed with hemostasis was satisfactory at all sites. Left lower quadrant incision was closed in layers with interrupted 3-0 Vicryl dermal suture and running 4-0 Monocryl subcuticular suture to close the skin. All 5-mm trocars were closed with 4-0 Monocryl subcuticular suture. Drain dressing was applied at the [address] exit site. The patient tolerated the entire procedure without incident. She was extubated in the OR and taken to recovery room postoperatively in stable condition. Gerardo Porter MD      [id number]  D:  02/01/2021 7:24  T:  02/01/2021 8:02  JOB #:  [id number]  CC:  Ludmila Payne.  Reina Neves DO

## 2021-02-01 NOTE — PROGRESS NOTES
3501 [facility] Report included the following information SBAR, Kardex, Intake/Output, MAR and Recent Results. Pt off floor. 0815 Pt return to floor. 1000 Pt ambulating in room just fine. Using Incentive spirometer and demonstrated good understanding. . Weaned off O2.    1945 Gave bedside shift report to on coming nurse Pascual Mayfield RN. Report included the following information SBAR, Kardex, Intake/Output, MAR and Recent Results.

## 2021-02-01 NOTE — PROGRESS NOTES
Problem: Falls - Risk of  Goal: *Absence of Falls  Description: Document Zenia Akhtar Fall Risk and appropriate interventions in the flowsheet.   Outcome: Progressing Towards Goal  Note: Fall Risk Interventions:            Medication Interventions: Assess postural VS orthostatic hypotension, Patient to call before getting OOB, Teach patient to arise slowly    Elimination Interventions: Call light in reach, Patient to call for help with toileting needs

## 2021-02-01 NOTE — INTERVAL H&P NOTE
Update History & Physical 
 
The Patient's History and Physical of February 1, 2021 was reviewed with the patient and I examined the patient. There was no change. The surgical site was confirmed by the patient and me. Plan:  The risk, benefits, expected outcome, and alternative to the recommended procedure have been discussed with the patient. Patient understands and wants to proceed with the procedure.  
 
Electronically signed by Iona Tomas MD on 2/1/2021 at 5:55 AM

## 2021-02-01 NOTE — PROGRESS NOTES
22:30 TRANSFER - IN REPORT:    Verbal report received from [phone] RN(name) on [facility]  being received from ER(unit) for routine progression of care      Report consisted of patients Situation, Background, Assessment and   Recommendations(SBAR). Information from the following report(s) SBAR was reviewed with the receiving nurse. Opportunity for questions and clarification was provided. Assessment completed upon patients arrival to unit and care assumed. 22:17 Arrived via w/c with clothes & cell phone, & . Admission docs completed along with dual skin assessment with Jozef Rice RN. CHG wipes completed. Oriented to room. Voided per BR but missed the hat. Resting quietly in bed with TV on.    04:50 2nd set of CHG wipes completed along with Nozin. Ambulated to BR to void. 04:50 TRANSFER - OUT REPORT:    Verbal report given to Betsy RN(name) on [facility]  being transferred to Pre-op(unit) for ordered procedure       Report consisted of patients Situation, Background, Assessment and   Recommendations(SBAR). Information from the following report(s) SBAR was reviewed with the receiving nurse. Lines:   Peripheral IV 76/23/32 Left Basilic (Active)   Site Assessment Clean, dry, & intact 01/31/21 1834   Phlebitis Assessment 0 01/31/21 1834   Infiltration Assessment 0 01/31/21 1834   Dressing Status Clean, dry, & intact 01/31/21 1834   Dressing Type Transparent 01/31/21 1834   Hub Color/Line Status Pink;Flushed 01/31/21 1834   Action Taken Blood drawn 01/31/21 1834        Opportunity for questions and clarification was provided. Patient transported with:   Registered Nurses    07:15 Bedside and Verbal shift change report given to SHAWN Roper RN (oncoming nurse) by Beauty Osler RN (offgoing nurse). Report included the following information SBAR.

## 2021-02-01 NOTE — ANESTHESIA POSTPROCEDURE EVALUATION
Post-Anesthesia Evaluation & Assessment    Visit Vitals  BP (!) 124/49   Pulse 94   Temp 37.3 °C (99.1 °F)   Resp 13   Ht 5' 8\" (1.727 m)   Wt 108 kg (238 lb)   SpO2 95%   Breastfeeding No   BMI 36.19 kg/m²       Nausea/Vomiting: Controlled. Post-operative hydration adequate. Pain Scale 1: Numeric (0 - 10) (02/01/21 0744)  Pain Intensity 1: 0 (02/01/21 0744)   Managed    Pain score at or below stated goal level. Mental status & Level of consciousness: alert and oriented x 3    Neurological status: moves all extremities, sensation grossly intact    Pulmonary status: airway patent, adequate oxygenation. Complications related to anesthesia: none    Patient has met all PACU discharge requirements.       Heidi Lobato DO

## 2021-02-01 NOTE — PERIOP NOTES
TRANSFER - OUT REPORT:    Verbal report given to Zayda Rojas Rn(name) on [facility]  being transferred to (unit) for routine post - op       Report consisted of patients Situation, Background, Assessment and   Recommendations(SBAR). Information from the following report(s) SBAR, Kardex, OR Summary, Procedure Summary, Intake/Output and MAR was reviewed with the receiving nurse. Lines:   Peripheral IV 61/15/53 Left Basilic (Active)   Site Assessment Clean, dry, & intact 02/01/21 0745   Phlebitis Assessment 0 02/01/21 0745   Infiltration Assessment 0 02/01/21 0745   Dressing Status Clean, dry, & intact 02/01/21 0745   Dressing Type Transparent;Tape 02/01/21 0745   Hub Color/Line Status Pink;Flushed 01/31/21 1834   Action Taken Blood drawn 01/31/21 1834        Opportunity for questions and clarification was provided.       Patient transported with:   Registered Nurse  Delia ESCOBAR

## 2021-02-01 NOTE — ANESTHESIA PREPROCEDURE EVALUATION
Relevant Problems   No relevant active problems       Anesthetic History   No history of anesthetic complications            Review of Systems / Medical History  Patient summary reviewed, nursing notes reviewed and pertinent labs reviewed    Pulmonary  Within defined limits                 Neuro/Psych   Within defined limits           Cardiovascular    Hypertension: well controlled              Exercise tolerance: >4 METS     GI/Hepatic/Renal  Within defined limits              Endo/Other        Obesity     Other Findings              Physical Exam    Airway  Mallampati: II  TM Distance: 4 - 6 cm  Neck ROM: normal range of motion   Mouth opening: Normal     Cardiovascular               Dental  No notable dental hx       Pulmonary                 Abdominal  GI exam deferred       Other Findings            Anesthetic Plan    ASA: 3  Anesthesia type: general          Induction: Intravenous  Anesthetic plan and risks discussed with: Patient

## 2021-02-01 NOTE — H&P
[facility] [place]  HISTORY AND PHYSICAL    Name:  Miguel Browne  MR#:   [id number]  :  1965  ACCOUNT #:  [de-identified]  ADMIT DATE:  2021    ADMISSION DIAGNOSIS:  Acute appendicitis. HISTORY OF PRESENT ILLNESS:  The patient is a 27-year-old female with history of hypertension and obesity, admitted to the [facility] Emergency Room for observation and subsequent surgery, with approximately a 20-hour history of abdominal pain. The pain began at approximately 1 o'clock early Saturday morning, as all over abdominal pain, cramping at times sharp, and has since migrated and become localized to her right lower quadrant. She denies any fever or chills. She has had some mild nausea and anorexia, but no vomiting. She denies any previous similar episode. She denies any diarrhea or other changes in bowel movements. PAST MEDICAL HISTORY:  1. Hypertension. 2.  Obesity. PAST SURGICAL HISTORY:  Tubal ligation. D+C. ALLERGIES:  NO KNOWN DRUG ALLERGIES. MEDICATIONS:  HCTZ 25mg Qam.  She did take it this morning. SOCIAL HISTORY:  She does not smoke. She rarely drinks alcohol, in small amounts and socially. She does not use any illicit drugs. FAMILY HISTORY:  Noncontributory. REVIEW OF SYSTEMS:  Reviewed with the patient, negative apart from that listed in the HPI. PHYSICAL EXAMINATION:  GENERAL:  She is well developed, well nourished, obese, in no acute distress. VITAL SIGNS:  She was hypertensive on admission at 189/72, repeat 173/59; afebrile on admission to 98.2; pulse at that time of 110, pulse has subsequently improved minimally to 99; respirations 20; and sating 100% on room air. HEENT:  Normocephalic, atraumatic. Pupils equal, round, reactive to light and accommodation. Extraocular movements intact. Sclerae anicteric bilaterally. NECK:  Supple. No JVD. LUNGS:  Clear to auscultation. CARDIOVASCULAR:  Regular rate and rhythm.   ABDOMEN:  Soft, nondistended. Good bowel sounds. Localized tenderness and guarding in the right lower quadrant. Mild positive Rovsing sign. Positive heel tap sign, negative obturator sign. RECTAL:  Deferred. EXTREMITIES:  No clubbing, cyanosis. Good capillary refill. No calf tenderness. ANCILLARY STUDIES:  White count on admission is elevated at 18.6, H and H 15.8 and 45.7, platelets 207. Left neutrophil shift 79. Coags are normal.  BMP showed a low potassium at 3.0, that is being replace in the emergency room. BUN and creatinine are normal at 16 and 0.74. RADIOLOGY:  CT of the abdomen and pelvis done with contrast shows inflammatory changes present adjacent to the cecum and appendix, with the appendix being mildly thickened, measuring 13 mm in diameter, and consistent with acute appendicitis without evidence of abscess or perforation. Incidentally noted colonic diverticulosis. ASSESSMENT:  A 60-year-old female with acute appendicitis. PLAN:  She is being admitted through the emergency room, n.p.o.  IV fluids have been started. As she ate earlier this afternoon, but had subsequently lost appetite, I feel probably Anesthesia would feel safest to proceed to the OR in several hours. I do not believe this will have an effect on the surgical procedure or outcome. Preoperative intravenous antibiotics will be given per protocol. I discussed with the patient the nature of the surgery, alternatives, benefits, and risks.   The risks include, but are not limited to, medication reaction, anesthesia complications, bleeding, infection, possible need to convert to an open procedure, possible need for additional surgery depending on intraoperative findings or postoperative pathology, inadvertant injury to intra-abdominal viscera requiring additional surgery, possible need for placing an operative drain, postoperative staple line dehiscence or other problems requiring drainage or additional surgical intervention, incisional hernia, poor wound healing postoperatively, postoperative fluid collection requiring drainage, postoperative pain, etc.  The patient understands these risks and is willing to give informed consent to proceed with surgery. Keira Otto MD RP/[email or web address]  D:  01/31/2021 21:18  T:  02/01/2021 0:20  JOB #:  [id number]  CC:  Mike Freeman.  [address] [facility],

## 2021-02-01 NOTE — ED NOTES
TRANSFER - OUT REPORT:    Verbal report given to Jenny Feliz (name) on Irvin Smith  being transferred to surgical (unit) for routine progression of care       Report consisted of patients Situation, Background, Assessment and   Recommendations(SBAR). Information from the following report(s) SBAR, ED Summary, Procedure Summary, MAR and Recent Results was reviewed with the receiving nurse. Lines:   Peripheral IV 88/92/62 Left Basilic (Active)   Site Assessment Clean, dry, & intact 01/31/21 1834   Phlebitis Assessment 0 01/31/21 1834   Infiltration Assessment 0 01/31/21 1834   Dressing Status Clean, dry, & intact 01/31/21 1834   Dressing Type Transparent 01/31/21 1834   Hub Color/Line Status Pink;Flushed 01/31/21 1834   Action Taken Blood drawn 01/31/21 1834        Opportunity for questions and clarification was provided.       Patient transported with:   [employer]

## 2021-02-01 NOTE — ROUTINE PROCESS
TRANSFER - IN REPORT: 
 
Verbal report received from JV Mcelroy on Destinee Cooper  being received from PACU for routine progression of care   
 
Report consisted of patient’s Situation, Background, Assessment and  
Recommendations(SBAR).  
 
Information from the following report(s) SBAR, OR Summary and Procedure Summary was reviewed with the receiving nurse. 
 
Opportunity for questions and clarification was provided.   
 
Assessment completed upon patient’s arrival to unit and care assumed.

## 2021-02-01 NOTE — PROGRESS NOTES
Reason for Admission:   Chart reviewed; per H&P, patient is Kimberly 59-year-old female with history of hypertension and obesity, admitted to the [facility] Emergency Room for observation and subsequent surgery, with approximately a 20-hour history of abdominal pain. The pain began at approximately 1 o'clock early Saturday morning, as all over abdominal pain, cramping at times sharp, and has since migrated and become localized to her right lower quadrant. \" Patient diagnosed w/ acute appendicitis and had laparascopic appendectomy and laparascopic partial cecectomy on 2/1/21. RUR Score:         Low, 7%            Plan for utilizing home health:   TBD       PCP: First and Last name:  Dr. Pebbles Parra   Name of Practice: Family Medicine   Are you a current patient: Yes/No: yes   Approximate date of last visit: last Sept/Oct - sees MD every 3-4 months   Can you participate in a virtual visit with your PCP: yes                    Current Advanced Directive/Advance Care Plan: full  Code, no ACP on record                         Transition of Care Plan:     Met with patient as she was preparing to get OOB for first time with primary nurse; patient verified she lives w/  and he will be her ride home, no DME use;  Care manager will follow for discharge needs but anticipate patient may be discharged later today if stable. Care Management Interventions  PCP Verified by CM:  Yes  Mode of Transport at Discharge: Self  Transition of Care Consult (CM Consult): Discharge Planning  Current Support Network: Own Home, Lives with Spouse  Confirm Follow Up Transport: Family  The Plan for Transition of Care is Related to the Following Treatment Goals : home when medically stable  The Patient and/or Patient Representative was Provided with a Choice of Provider and Agrees with the Discharge Plan?: Yes  Name of the Patient Representative Who was Provided with a Choice of Provider and Agrees with the Discharge Plan: Jensen Salvador, patient  Discharge Location  Discharge Placement: Home

## 2021-02-01 NOTE — BRIEF OP NOTE
Brief Postoperative Note    Patient: Irvin Smith  YOB: 1965  MRN: [id number]    Date of Procedure: 2/1/2021     Pre-Op Diagnosis: Acute appendicitis, unspecified acute appendicitis type [K35.80]    Post-Op Diagnosis: acute appendicitis, with contained perforation, localized peritonitis      Procedure(s):  APPENDECTOMY LAPAROSCOPIC    Surgeon(s):  Mihai Gallegos MD    Surgical Assistant: Surg Asst-1: Joesph Weaver    Anesthesia: General     Estimated Blood Loss (mL): Minimal    Complications: None    Specimens:   ID Type Source Tests Collected by Time Destination   1 : APPENDIX Preservative Appendix  Mihai Gallegos MD 2/1/2021 0701 Pathology        Implants: * No implants in log *    Drains:   [place]-Pulliam Drain 02/01/21 Abdomen (Active)   Site Assessment Clean, dry, & intact 02/01/21 0708   Dressing Status Clean, dry, & intact 02/01/21 0708   Status Patent; Charged;Draining 02/01/21 0708   Drainage Color Serosanguinous 02/01/21 0708       Findings: acute appendicitis with small contained perforation    Electronically Signed by Nadia Becker MD on 2/1/2021 at 7:10 AM    Op note dictated #[id number]

## 2021-02-02 LAB
ANION GAP SERPL CALC-SCNC: 8 MMOL/L (ref 3–18)
BASOPHILS # BLD: 0 K/UL (ref 0–0.1)
BASOPHILS NFR BLD: 0 % (ref 0–2)
BUN SERPL-MCNC: 8 MG/DL (ref 7–18)
BUN/CREAT SERPL: 14 (ref 12–20)
CALCIUM SERPL-MCNC: 7.9 MG/DL (ref 8.5–10.1)
CHLORIDE SERPL-SCNC: 108 MMOL/L (ref 100–111)
CO2 SERPL-SCNC: 26 MMOL/L (ref 21–32)
CREAT SERPL-MCNC: 0.59 MG/DL (ref 0.6–1.3)
DIFFERENTIAL METHOD BLD: ABNORMAL
EOSINOPHIL # BLD: 0 K/UL (ref 0–0.4)
EOSINOPHIL NFR BLD: 0 % (ref 0–5)
ERYTHROCYTE [DISTWIDTH] IN BLOOD BY AUTOMATED COUNT: 13.4 % (ref 11.6–14.5)
GLUCOSE SERPL-MCNC: 101 MG/DL (ref 74–99)
HCT VFR BLD AUTO: 37.2 % (ref 35–45)
HGB BLD-MCNC: 12.3 G/DL (ref 12–16)
LYMPHOCYTES # BLD: 1.4 K/UL (ref 0.9–3.6)
LYMPHOCYTES NFR BLD: 9 % (ref 21–52)
MCH RBC QN AUTO: 30.4 PG (ref 24–34)
MCHC RBC AUTO-ENTMCNC: 33.1 G/DL (ref 31–37)
MCV RBC AUTO: 92.1 FL (ref 74–97)
MONOCYTES # BLD: 1.1 K/UL (ref 0.05–1.2)
MONOCYTES NFR BLD: 7 % (ref 3–10)
NEUTS SEG # BLD: 12.8 K/UL (ref 1.8–8)
NEUTS SEG NFR BLD: 84 % (ref 40–73)
PLATELET # BLD AUTO: 315 K/UL (ref 135–420)
PMV BLD AUTO: 9.9 FL (ref 9.2–11.8)
POTASSIUM SERPL-SCNC: 3.4 MMOL/L (ref 3.5–5.5)
RBC # BLD AUTO: 4.04 M/UL (ref 4.2–5.3)
SODIUM SERPL-SCNC: 142 MMOL/L (ref 136–145)
WBC # BLD AUTO: 15.3 K/UL (ref 4.6–13.2)

## 2021-02-02 PROCEDURE — 77010033678 HC OXYGEN DAILY

## 2021-02-02 PROCEDURE — 36415 COLL VENOUS BLD VENIPUNCTURE: CPT

## 2021-02-02 PROCEDURE — 74011000258 HC RX REV CODE- 258: Performed by: SURGERY

## 2021-02-02 PROCEDURE — 85025 COMPLETE CBC W/AUTO DIFF WBC: CPT

## 2021-02-02 PROCEDURE — 65270000029 HC RM PRIVATE

## 2021-02-02 PROCEDURE — 74011250637 HC RX REV CODE- 250/637: Performed by: SURGERY

## 2021-02-02 PROCEDURE — 80048 BASIC METABOLIC PNL TOTAL CA: CPT

## 2021-02-02 PROCEDURE — 74011250636 HC RX REV CODE- 250/636: Performed by: SURGERY

## 2021-02-02 RX ORDER — POTASSIUM CHLORIDE 7.45 MG/ML
10 INJECTION INTRAVENOUS
Status: COMPLETED | OUTPATIENT
Start: 2021-02-02 | End: 2021-02-02

## 2021-02-02 RX ADMIN — POTASSIUM CHLORIDE 10 MEQ: 10 INJECTION, SOLUTION INTRAVENOUS at 13:22

## 2021-02-02 RX ADMIN — ZOLPIDEM TARTRATE 5 MG: 5 TABLET ORAL at 21:25

## 2021-02-02 RX ADMIN — ACETAMINOPHEN 650 MG: 325 TABLET ORAL at 13:21

## 2021-02-02 RX ADMIN — PIPERACILLIN AND TAZOBACTAM 3.38 G: 3; .375 INJECTION, POWDER, LYOPHILIZED, FOR SOLUTION INTRAVENOUS at 15:32

## 2021-02-02 RX ADMIN — PIPERACILLIN AND TAZOBACTAM 3.38 G: 3; .375 INJECTION, POWDER, LYOPHILIZED, FOR SOLUTION INTRAVENOUS at 04:13

## 2021-02-02 RX ADMIN — POTASSIUM CHLORIDE 10 MEQ: 10 INJECTION, SOLUTION INTRAVENOUS at 08:45

## 2021-02-02 RX ADMIN — ACETAMINOPHEN 650 MG: 325 TABLET ORAL at 06:36

## 2021-02-02 RX ADMIN — POTASSIUM CHLORIDE 10 MEQ: 10 INJECTION, SOLUTION INTRAVENOUS at 12:01

## 2021-02-02 RX ADMIN — OXYCODONE AND ACETAMINOPHEN 1 TABLET: 5; 325 TABLET ORAL at 19:41

## 2021-02-02 RX ADMIN — POTASSIUM CHLORIDE 10 MEQ: 10 INJECTION, SOLUTION INTRAVENOUS at 10:44

## 2021-02-02 RX ADMIN — PIPERACILLIN AND TAZOBACTAM 3.38 G: 3; .375 INJECTION, POWDER, LYOPHILIZED, FOR SOLUTION INTRAVENOUS at 20:02

## 2021-02-02 NOTE — PROGRESS NOTES
Care manager rounded on patient - per patient she is feeling better, awaiting results of her potassium level after K runs; if improved she states she expects to discharge later. Per patient has been ambulating in room. No DME or HH needs identified; patient does still have drain in and postop appt will be coordinated by Dr. Mony Arboleda office. Care Management Interventions  PCP Verified by CM:  Yes  Mode of Transport at Discharge: Self  Transition of Care Consult (CM Consult): Discharge Planning  Current Support Network: Own Home, Lives with Spouse  Confirm Follow Up Transport: Family  The Plan for Transition of Care is Related to the Following Treatment Goals : home when medically stable  The Patient and/or Patient Representative was Provided with a Choice of Provider and Agrees with the Discharge Plan?: Yes  Name of the Patient Representative Who was Provided with a Choice of Provider and Agrees with the Discharge Plan: Vickie Keys, patient  Discharge Location  Discharge Placement: Home

## 2021-02-02 NOTE — PROGRESS NOTES
POD#1  Pt feeling MUCH better compared to preop. No N/V. Jean-Claude clears. +BM w flatus. Pain control adequate w Tylenol  AVSS  PETER 140/24h    RRR  CTA  S/ND, +BS, incisions c/d/i,  PETER site c/d w serosang inbulb  No calf tenderness    WBC improved to 15, K 3.4    Stable s/p lap appy for perf appendicitis  Full liquids tonight, advance as jean-claude  Decrease IVF  Replace K  Cont Zosyn  Repeat CBC, BMP, Mg in am  Cont PETER.   OOB/Spirometer

## 2021-02-02 NOTE — PROGRESS NOTES
0730 Received bedside shift report from off going nurse Mary Reyes RN. Report included the following information SBAR, Kardex, Intake/Output, MAR and Recent Results. 1945 Provided bedside shift report to oncoming nurse  Meliza Roberson RN. Report included the following information SBAR, Kardex, Intake/Output, MAR and Recent Results.

## 2021-02-02 NOTE — PROGRESS NOTES
Bedside and Verbal shift change report given JV Cortes (oncoming nurse) by Cecil Read RN (offgoing nurse). Report included the following information SBAR, Kardex, MAR and Recent Results.

## 2021-02-03 VITALS
WEIGHT: 275.35 LBS | HEIGHT: 68 IN | RESPIRATION RATE: 16 BRPM | DIASTOLIC BLOOD PRESSURE: 56 MMHG | HEART RATE: 88 BPM | OXYGEN SATURATION: 94 % | SYSTOLIC BLOOD PRESSURE: 123 MMHG | TEMPERATURE: 98.2 F | BODY MASS INDEX: 41.73 KG/M2

## 2021-02-03 LAB
ANION GAP SERPL CALC-SCNC: 7 MMOL/L (ref 3–18)
BASOPHILS # BLD: 0 K/UL (ref 0–0.1)
BASOPHILS NFR BLD: 0 % (ref 0–2)
BUN SERPL-MCNC: 8 MG/DL (ref 7–18)
BUN/CREAT SERPL: 11 (ref 12–20)
CALCIUM SERPL-MCNC: 7.7 MG/DL (ref 8.5–10.1)
CHLORIDE SERPL-SCNC: 108 MMOL/L (ref 100–111)
CO2 SERPL-SCNC: 27 MMOL/L (ref 21–32)
CREAT SERPL-MCNC: 0.7 MG/DL (ref 0.6–1.3)
DIFFERENTIAL METHOD BLD: ABNORMAL
EOSINOPHIL # BLD: 0.2 K/UL (ref 0–0.4)
EOSINOPHIL NFR BLD: 2 % (ref 0–5)
ERYTHROCYTE [DISTWIDTH] IN BLOOD BY AUTOMATED COUNT: 13.4 % (ref 11.6–14.5)
GLUCOSE SERPL-MCNC: 87 MG/DL (ref 74–99)
HCT VFR BLD AUTO: 38.2 % (ref 35–45)
HGB BLD-MCNC: 12.6 G/DL (ref 12–16)
LYMPHOCYTES # BLD: 1.8 K/UL (ref 0.9–3.6)
LYMPHOCYTES NFR BLD: 15 % (ref 21–52)
MAGNESIUM SERPL-MCNC: 2.1 MG/DL (ref 1.6–2.6)
MCH RBC QN AUTO: 30.5 PG (ref 24–34)
MCHC RBC AUTO-ENTMCNC: 33 G/DL (ref 31–37)
MCV RBC AUTO: 92.5 FL (ref 74–97)
MONOCYTES # BLD: 0.7 K/UL (ref 0.05–1.2)
MONOCYTES NFR BLD: 6 % (ref 3–10)
NEUTS SEG # BLD: 9.1 K/UL (ref 1.8–8)
NEUTS SEG NFR BLD: 77 % (ref 40–73)
PLATELET # BLD AUTO: 377 K/UL (ref 135–420)
PMV BLD AUTO: 9.9 FL (ref 9.2–11.8)
POTASSIUM SERPL-SCNC: 3.8 MMOL/L (ref 3.5–5.5)
RBC # BLD AUTO: 4.13 M/UL (ref 4.2–5.3)
SODIUM SERPL-SCNC: 142 MMOL/L (ref 136–145)
WBC # BLD AUTO: 11.9 K/UL (ref 4.6–13.2)

## 2021-02-03 PROCEDURE — 80048 BASIC METABOLIC PNL TOTAL CA: CPT

## 2021-02-03 PROCEDURE — 74011250636 HC RX REV CODE- 250/636: Performed by: SURGERY

## 2021-02-03 PROCEDURE — 74011000258 HC RX REV CODE- 258: Performed by: SURGERY

## 2021-02-03 PROCEDURE — 85025 COMPLETE CBC W/AUTO DIFF WBC: CPT

## 2021-02-03 PROCEDURE — 36415 COLL VENOUS BLD VENIPUNCTURE: CPT

## 2021-02-03 PROCEDURE — 83735 ASSAY OF MAGNESIUM: CPT

## 2021-02-03 PROCEDURE — 74011250637 HC RX REV CODE- 250/637: Performed by: SURGERY

## 2021-02-03 RX ORDER — ONDANSETRON 4 MG/1
8 TABLET, ORALLY DISINTEGRATING ORAL
Status: DISCONTINUED | OUTPATIENT
Start: 2021-02-03 | End: 2021-02-03 | Stop reason: HOSPADM

## 2021-02-03 RX ORDER — CIPROFLOXACIN 250 MG/1
250 TABLET, FILM COATED ORAL EVERY 12 HOURS
Qty: 14 TAB | Refills: 0 | Status: SHIPPED | OUTPATIENT
Start: 2021-02-03

## 2021-02-03 RX ORDER — METRONIDAZOLE 250 MG/1
250 TABLET ORAL 3 TIMES DAILY
Qty: 21 TAB | Refills: 0 | Status: SHIPPED | OUTPATIENT
Start: 2021-02-03

## 2021-02-03 RX ORDER — CIPROFLOXACIN 500 MG/1
500 TABLET ORAL ONCE
Status: COMPLETED | OUTPATIENT
Start: 2021-02-03 | End: 2021-02-03

## 2021-02-03 RX ORDER — METRONIDAZOLE 250 MG/1
250 TABLET ORAL ONCE
Status: COMPLETED | OUTPATIENT
Start: 2021-02-03 | End: 2021-02-03

## 2021-02-03 RX ORDER — OXYCODONE AND ACETAMINOPHEN 5; 325 MG/1; MG/1
1 TABLET ORAL
Qty: 12 TAB | Refills: 0 | Status: SHIPPED | OUTPATIENT
Start: 2021-02-03 | End: 2021-02-06

## 2021-02-03 RX ORDER — ONDANSETRON 8 MG/1
8 TABLET, ORALLY DISINTEGRATING ORAL
Qty: 12 TAB | Refills: 0 | Status: SHIPPED | OUTPATIENT
Start: 2021-02-03

## 2021-02-03 RX ADMIN — METRONIDAZOLE 250 MG: 250 TABLET ORAL at 12:46

## 2021-02-03 RX ADMIN — ACETAMINOPHEN 650 MG: 325 TABLET ORAL at 08:53

## 2021-02-03 RX ADMIN — CIPROFLOXACIN 500 MG: 500 TABLET, FILM COATED ORAL at 12:46

## 2021-02-03 RX ADMIN — OXYCODONE AND ACETAMINOPHEN 1 TABLET: 5; 325 TABLET ORAL at 01:27

## 2021-02-03 RX ADMIN — OXYCODONE AND ACETAMINOPHEN 1 TABLET: 5; 325 TABLET ORAL at 15:04

## 2021-02-03 RX ADMIN — PIPERACILLIN AND TAZOBACTAM 3.38 G: 3; .375 INJECTION, POWDER, LYOPHILIZED, FOR SOLUTION INTRAVENOUS at 08:40

## 2021-02-03 RX ADMIN — PIPERACILLIN AND TAZOBACTAM 3.38 G: 3; .375 INJECTION, POWDER, LYOPHILIZED, FOR SOLUTION INTRAVENOUS at 01:27

## 2021-02-03 NOTE — PROGRESS NOTES
POD#2  Pt feeling MUCH better compared to preop. No N/V. Jackie reg.  +loose BM w flatus. Pain control adequate w Tylenol  AVSS  PETER 30/24h -- removed    RRR  CTA  S/ND, +BS, incisions c/d/i,  PETER site c/d/i w dressing  No calf tenderness    WBC nl, K/Mg nl    Stable s/p lap appy for perf appendicitis  Reg diet  abx changed to PO cipro/flagyl  OOB/Spirometer  D/c to home  F/u w me in ~3 weeks.

## 2021-02-03 NOTE — DISCHARGE SUMMARY
Physician Discharge Summary     Patient ID:  Kvng Nichols  [id number]  54 y.o.  1965    Allergies: Patient has no known allergies. Admit Date: 1/31/2021    Discharge Date: 2/3/2021    * Admission Diagnoses: Acute appendicitis [K35.80]  Acute appendicitis with perforation and localized peritonitis [K35.32]  S/P laparoscopic appendectomy [Z90.49]    * Discharge Diagnoses:    Hospital Problems as of 2/3/2021 Date Reviewed: 2/1/2021          Codes Class Noted - Resolved POA    S/P laparoscopic appendectomy ICD-10-CM: Z90.49  ICD-9-CM: V45.89  2/1/2021 - Present Unknown        Acute appendicitis with perforation and localized peritonitis ICD-10-CM: K35.32  ICD-9-CM: 540.0  2/1/2021 - Present Unknown        Acute appendicitis ICD-10-CM: K35.80  ICD-9-CM: 540.9  1/31/2021 - Present Unknown               Admission Condition: Poor    * Discharge Condition: improved    * Procedures: Procedure(s):  [facility] Course:   Normal hospital course for this procedure. Consults: None    Significant Diagnostic Studies: CT, labs    * Disposition: Home    Discharge Medications:   Current Discharge Medication List          * Follow-up Care/Patient Instructions: Activity: No lifting or Strenuous exercise for 2 weeks  Diet: Regular Diet  Wound Care: None needed (Pt may shower starting 2/4)    Follow-up Information     Follow up With Specialties Details Why Contact [facility], [address] [place] [address]  [phone]      Monique Brown MD General Surgery  Care Mgmt subtmitted a request on 2/1/2021 to MD's office (Dr. Niko Naranjo) for f/u appointment. Patient to contact MD's office upon discharge to confirm f/u appointment date/time.  [address] [place] [address] Drive  [phone]          Follow-up tests/labs none  F/U in my office in ~3-4 weeks    Signed:  Eran Ponce MD  2/3/2021  2:07 PM

## 2021-02-03 NOTE — DISCHARGE INSTRUCTIONS
Patient Education        Appendectomy: What to Expect at Home  Your Recovery     Your doctor removed your appendix either by making many small cuts, called incisions, in your belly (laparoscopic surgery) or through open surgery. In open surgery, the doctor makes one large incision. The incisions leave scars that usually fade over time. After your surgery, it is normal to feel weak and tired for several days after you return home. Your belly may be swollen and may be painful. If you had laparoscopic surgery, you may have pain in your shoulder for about 24 hours. You may also feel sick to your stomach and have diarrhea, constipation, gas, or a headache. This usually goes away in a few days. Your recovery time depends on the type of surgery you had. If you had laparoscopic surgery, you will probably be able to return to work or a normal routine 1 to 3 weeks after surgery. If you had an open surgery, it may take 2 to 4 weeks. If your appendix ruptured, you may have a drain in your incision. Your body will work fine without an appendix. You won't have to make any changes in your diet or lifestyle. This care sheet gives you a general idea about how long it will take for you to recover. But each person recovers at a different pace. Follow the steps below to get better as quickly as possible. How can you care for yourself at home? Activity    · Rest when you feel tired. Getting enough sleep will help you recover.     · Try to walk each day. Start by walking a little more than you did the day before. Bit by bit, increase the amount you walk. Walking boosts blood flow and helps prevent pneumonia and constipation.     · For about 2 weeks, avoid lifting anything that would make you strain.  This may include a child, heavy grocery bags and milk containers, a heavy briefcase or backpack, cat litter or dog food bags, or a vacuum .     · Avoid strenuous activities, such as bicycle riding, jogging, weight lifting, or aerobic exercise, until your doctor says it is okay.     · You may be able to take showers (unless you have a drain near your incision) 24 to 48 hours after surgery. Pat the incision dry. Do not take a bath for the first 2 weeks, or until your doctor tells you it is okay. If you have a drain near your incision, follow your doctor's instructions.     · You may drive when you are no longer taking pain medicine and can quickly move your foot from the gas pedal to the brake. You must also be able to sit comfortably for a long period of time, even if you do not plan on going far. You might get caught in traffic.     · You will probably be able to go back to work in 1 to 3 weeks. If you had an open surgery, it may take 3 to 4 weeks.     · Your doctor will tell you when you can have sex again. Diet    · You can eat your normal diet. If your stomach is upset, try bland, low-fat foods like plain rice, broiled chicken, toast, and yogurt.     · Drink plenty of fluids (unless your doctor tells you not to).     · You may notice that your bowel movements are not regular right after your surgery. This is common. Try to avoid constipation and straining with bowel movements. You may want to take a fiber supplement every day. If you have not had a bowel movement after a couple of days, ask your doctor about taking a mild laxative. Medicines    · Your doctor will tell you if and when you can restart your medicines. He or she will also give you instructions about taking any new medicines.     · If you take aspirin or some other blood thinner, ask your doctor if and when to start taking it again. Make sure that you understand exactly what your doctor wants you to do.     · If your appendix ruptured, you will need to take antibiotics. Take them as directed. Do not stop taking them just because you feel better. You need to take the full course of antibiotics.     · Be safe with medicines. Take pain medicines exactly as directed. ?  If the doctor gave you a prescription medicine for pain, take it as prescribed. ? If you are not taking a prescription pain medicine, take an over-the-counter medicine such as acetaminophen (Tylenol), ibuprofen (Advil, Motrin), or naproxen (Aleve). Read and follow all instructions on the label. ? Do not take two or more pain medicines at the same time unless the doctor told you to. Many pain medicines have acetaminophen, which is Tylenol. Too much Tylenol can be harmful.     · If you think your pain medicine is making you sick to your stomach:  ? Take your medicine after meals (unless your doctor has told you not to). ? Ask your doctor for a different pain medicine. Incision care    · If you had an open surgery, you may have staples in your incision. The doctor will take these out in 7 to 10 days.     · If you have strips of tape on the incision, leave the tape on for a week or until it falls off.     · You may wash the area with warm, soapy water 24 to 48 hours after your surgery, unless your doctor tells you not to. Pat the area dry.     · Keep the area clean and dry. You may cover it with a gauze bandage if it weeps or rubs against clothing. Change the bandage every day.     · If your appendix ruptured, you may have an incision with packing in it. Change the packing as often as your doctor tells you to. ? Packing changes may hurt at first. Taking pain medicine about half an hour before you change the dressing can help. ? If your dressing sticks to your wound, try soaking it with warm water for about 10 minutes before you remove it. You can do this in the shower or by placing a wet washcloth over the dressing. ? Remove the old packing and flush the incision with water. Gently pat the top area dry. ? The size of the incision determines how much gauze you need to put inside. Fold the gauze over once, but do not wad it up so that it hurts. Put it in the wound carefully.  You want to keep the sides of the wound from touching. A cotton swab may help you push the gauze in as needed. ? Put a gauze pad over the wound, and tape it down. ? You may notice greenish gray fluid seeping from your wound as you start to heal. This is normal. It is a sign that your wound is healing. Follow-up care is a key part of your treatment and safety. Be sure to make and go to all appointments, and call your doctor if you are having problems. It's also a good idea to know your test results and keep a list of the medicines you take. When should you call for help? Call 911 anytime you think you may need emergency care. For example, call if:    · You passed out (lost consciousness).     · You are short of breath. .   Call your doctor now or seek immediate medical care if:    · You are sick to your stomach and cannot drink fluids.     · You cannot pass stools or gas.     · You have pain that does not get better when you take your pain medicine.     · You have signs of infection, such as:  ? Increased pain, swelling, warmth, or redness. ? Red streaks leading from the wound. ? Pus draining from the wound. ? A fever.     · You have loose stitches, or your incision comes open.     · Bright red blood has soaked through the bandage over your incision.     · You have signs of a blood clot in your leg (called a deep vein thrombosis), such as:  ? Pain in your calf, back of knee, thigh, or groin. ? Redness and swelling in your leg or groin. Watch closely for any changes in your health, and be sure to contact your doctor if you have any problems. Where can you learn more? Go to http://www.gray.com/  Enter X801 in the search box to learn more about \"Appendectomy: What to Expect at Home. \"  Current as of: April 15, 2020               Content Version: 12.6  © 6326-0235 [employer]. Care instructions adapted under license by [employer] (which disclaims liability or warranty for this information).  If you have questions about a medical condition or this instruction, always ask your healthcare professional. [place] [address] any warranty or liability for your use of this information.

## 2021-02-03 NOTE — PROGRESS NOTES
6261 
Bedside and Verbal shift change report given to HARPAL Gupta (oncoming nurse) by HEATHER Quesada RN (offgoing nurse). Report included the following information SBAR, Kardex, OR Summary, Intake/Output and MAR. 
 
5066 Shift assessment completed. Pt AOX4 RA. Lung sounds clear, bowel sounds. Pt has trocar sites x3 to the abdomen that are REJI and CDI. Pt also has a PETER drain to the abdomen that is patent and draining. Skin is otherwise intact. Pt has 62T Left Basilic CDI. Pt educated on the importance of frequent ambulation as tolerated and incentive spirometer use. Pt verbalizes understanding. Pt received PRN Tylenol (650mg) for 5/10 pain in the abdomen. 0900 Pt refused scheduled Hydrodiuril dose this AM.  
 
1000 Pt reports 3/10 pain in the abdomen for which she is eating and utilizing rest for. 1 Genaro Varner Pt reports 3/10 pain in abdomen for which she is utilizing rest for. 95U Left Basilic removed d/t infiltration. Spoke with Dr. Leda Reilly regarding loss of IV access and pt request for discharge. Ciprofloxacin (500mg) and Flagyl (250mg) PO once dose ordered. Pt anticipating discharge this afternoon. 1300 
PETER drain discontinued with 15ml of yellow drainage. Pt tolerated procedure well. Pt denies any reports of pain or discomfort. 12 Pt received PRN Percocet (1 tab) for 5/10 pain in the abdomen. Pt voiding and tolerating diet well. Pt reports BM x2. Pt received discharge instructions from HARPAL Gupta and was provided the opportunity to address any questions or concerns. Pt educated on incision site care and was provided with supplies for home. Pt verbalized and demonstrated understanding. Shena Pt discharged via transport with belongings.

## 2021-02-03 NOTE — PROGRESS NOTES
Problem: Falls - Risk of  Goal: *Absence of Falls  Description: Document Parimon Kwadwo Fall Risk and appropriate interventions in the flowsheet.   Outcome: Progressing Towards Goal  Note: Fall Risk Interventions:            Medication Interventions: Teach patient to arise slowly    Elimination Interventions: Call light in reach              Problem: Patient Education: Go to Patient Education Activity  Goal: Patient/Family Education  Outcome: Progressing Towards Goal

## (undated) DEVICE — SOLUTION LACTATED RINGERS INJECTION USP

## (undated) DEVICE — APPLIER CLP M L L11.4IN DIA10MM ENDOSCP ROT MULT FOR LIG

## (undated) DEVICE — [HIGH FLOW INSUFFLATOR,  DO NOT USE IF PACKAGE IS DAMAGED,  KEEP DRY,  KEEP AWAY FROM SUNLIGHT,  PROTECT FROM HEAT AND RADIOACTIVE SOURCES.]: Brand: PNEUMOSURE

## (undated) DEVICE — SUTURE VCRL SZ 3-0 L27IN ABSRB UD L26MM SH 1/2 CIR J416H

## (undated) DEVICE — STERILE POLYISOPRENE POWDER-FREE SURGICAL GLOVES WITH EMOLLIENT COATING: Brand: PROTEXIS

## (undated) DEVICE — SUT ETHLN 3-0 18IN PS2 BLK --

## (undated) DEVICE — VISUALIZATION SYSTEM: Brand: CLEARIFY

## (undated) DEVICE — DERMABOND SKIN ADH 0.7ML --

## (undated) DEVICE — THIS PRODUCT IS SINGLE USE AND INTENDED TO BE USED FOR BLUNT DISSECTION OF TISSUE.: Brand: ASPEN® ENDOSCOPIC KITTNER, SINGLE TIP

## (undated) DEVICE — TUBING, SUCTION, 1/4" X 12', STRAIGHT: Brand: MEDLINE

## (undated) DEVICE — SUT VCRL + 0 36IN UR6 VIO --

## (undated) DEVICE — LAPAROSCOPIC TROCAR SLEEVE/SINGLE USE: Brand: KII® OPTICAL ACCESS SYSTEM

## (undated) DEVICE — SUT MONOCRYL PLUS UD 3-0 --

## (undated) DEVICE — ENDOCUT SCISSOR TIP, DISPOSABLE: Brand: RENEW

## (undated) DEVICE — FCPS ENDOSCP 5MMX33CM -- ENDOPATH

## (undated) DEVICE — SHEAR HARMONIC 5MMX45CM -- ACE 7+

## (undated) DEVICE — TROCAR RMFG Z 3RD SLEEVE 5X100 -- LAWSON OEM ITEM 262365 PK/6

## (undated) DEVICE — DRAIN SURG 19FR 0.25IN SIL RND W/ TRCR INDIC DOT RADPQ FULL

## (undated) DEVICE — STERILE POLYISOPRENE POWDER-FREE SURGICAL GLOVES: Brand: PROTEXIS

## (undated) DEVICE — LAP CHOLE: Brand: MEDLINE INDUSTRIES, INC.

## (undated) DEVICE — E-Z CLEAN, PTFE COATED, ELECTROSURGICAL LAPAROSCOPIC ELECTRODE, J-HOOK, 33 CM., SINGLE-USE, FOR USE WITH HAND CONTROL PENCIL: Brand: MEGADYNE

## (undated) DEVICE — Device

## (undated) DEVICE — RELOAD STPL H1-2.5X45MM VASC THN TISS WHT 6 ROW B FRM SGL

## (undated) DEVICE — RELOAD STPL SZ 0 L45MM DIA3.5MM 0DEG STD REG TISS BLU TI

## (undated) DEVICE — INSUFFLATION NEEDLE TO ESTABLISH PNEUMOPERITONEUM.: Brand: INSUFFLATION NEEDLE

## (undated) DEVICE — TROCAR: Brand: KII® OPTICAL ACCESS SYSTEM

## (undated) DEVICE — DISPOSABLE SUCTION/IRRIGATOR TUBE SET WITH TIP: Brand: AHTO

## (undated) DEVICE — CUTTER ENDOSCP L340MM LIN ARTC SGL STROKE FIRING ENDOPATH

## (undated) DEVICE — TISSUE RETRIEVAL SYSTEM: Brand: INZII RETRIEVAL SYSTEM